# Patient Record
Sex: FEMALE | Race: WHITE | Employment: FULL TIME | URBAN - METROPOLITAN AREA
[De-identification: names, ages, dates, MRNs, and addresses within clinical notes are randomized per-mention and may not be internally consistent; named-entity substitution may affect disease eponyms.]

---

## 2019-06-03 ENCOUNTER — APPOINTMENT (OUTPATIENT)
Dept: GENERAL RADIOLOGY | Age: 44
End: 2019-06-03
Payer: COMMERCIAL

## 2019-06-03 ENCOUNTER — HOSPITAL ENCOUNTER (EMERGENCY)
Age: 44
Discharge: HOME | End: 2019-06-04
Payer: COMMERCIAL

## 2019-06-03 VITALS
WEIGHT: 283.95 LBS | TEMPERATURE: 98.4 F | SYSTOLIC BLOOD PRESSURE: 121 MMHG | HEART RATE: 81 BPM | RESPIRATION RATE: 14 BRPM | HEIGHT: 65 IN | OXYGEN SATURATION: 97 % | DIASTOLIC BLOOD PRESSURE: 70 MMHG | BODY MASS INDEX: 47.31 KG/M2

## 2019-06-03 DIAGNOSIS — M25.532 WRIST PAIN, ACUTE, LEFT: ICD-10-CM

## 2019-06-03 DIAGNOSIS — M25.522 ELBOW PAIN, LEFT: ICD-10-CM

## 2019-06-03 DIAGNOSIS — M79.645 THUMB PAIN, LEFT: ICD-10-CM
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
## 2019-06-03 PROCEDURE — 4500000024 HC ED LEVEL 4 PROCEDURE

## 2019-06-03 PROCEDURE — 6370000000 HC RX 637 (ALT 250 FOR IP): Performed by: NURSE PRACTITIONER

## 2019-06-03 PROCEDURE — 73110 X-RAY EXAM OF WRIST: CPT

## 2019-06-03 PROCEDURE — 99284 EMERGENCY DEPT VISIT MOD MDM: CPT

## 2019-06-03 PROCEDURE — 73080 X-RAY EXAM OF ELBOW: CPT

## 2019-06-03 RX ORDER — NAPROXEN 500 MG/1
500 TABLET ORAL 2 TIMES DAILY
Qty: 60 TABLET | Refills: 0 | Status: SHIPPED | OUTPATIENT
Start: 2019-06-03

## 2019-06-03 RX ORDER — HYDROCODONE BITARTRATE AND ACETAMINOPHEN 5; 325 MG/1; MG/1
1 TABLET ORAL ONCE
Status: COMPLETED | OUTPATIENT
Start: 2019-06-03 | End: 2019-06-03

## 2019-06-03 RX ADMIN — HYDROCODONE BITARTRATE AND ACETAMINOPHEN 1 TABLET: 5; 325 TABLET ORAL at 23:47

## 2019-06-03 ASSESSMENT — PAIN DESCRIPTION - PROGRESSION: CLINICAL_PROGRESSION: GRADUALLY WORSENING

## 2019-06-03 ASSESSMENT — PAIN DESCRIPTION - PAIN TYPE: TYPE: ACUTE PAIN

## 2019-06-03 ASSESSMENT — PAIN DESCRIPTION - ORIENTATION: ORIENTATION: LEFT

## 2019-06-03 ASSESSMENT — PAIN DESCRIPTION - DESCRIPTORS: DESCRIPTORS: THROBBING

## 2019-06-03 ASSESSMENT — PAIN DESCRIPTION - LOCATION: LOCATION: WRIST

## 2019-06-03 ASSESSMENT — PAIN SCALES - GENERAL
PAINLEVEL_OUTOF10: 6
PAINLEVEL_OUTOF10: 6

## 2019-06-03 ASSESSMENT — PAIN DESCRIPTION - ONSET: ONSET: SUDDEN

## 2019-06-03 ASSESSMENT — PAIN DESCRIPTION - FREQUENCY: FREQUENCY: CONTINUOUS

## 2019-06-04 ASSESSMENT — PAIN SCALES - GENERAL: PAINLEVEL_OUTOF10: 5

## 2019-06-04 NOTE — ED PROVIDER NOTES
[address]  [address] [facility] [address]  Dept: [phone]  Loc: [phone]    eMERGENCY dEPARTMENT eNCOUnter    Evaluated by the Advanced Practice Provider    CHIEF COMPLAINT    Chief Complaint   Patient presents with    Wrist Injury     left     Elbow Injury     left        HPI    Yefri Mcfadden is a 37 y.o. female who presents with left wrist, elbow and thumb pain. The onset was shortly PTA. The duration has been constant since the onset. The quality of the pain is sharp and the severity is 7/10. The patient has no other associated injury. The context is she was walking her dog when her dog's all a another dog and went to run after it, she pulled the leash and fell down extending her hands, fell on the left hand, wrist and elbow. Denies any numbness or tingling. No previous surgeries on this. Is right-handed dominant. No lacerations or bite sustained during the event. States that she came to the ED for further evaluation and treatment. REVIEW OF SYSTEMS    Skin: No lacerations or puncture wounds  Musculoskeletal: see HPI, no other joint or bony injury or pain  Neurologic: No loss of consciousness, no head injury, no paresthesias or focal distal extremity weakness  All other systems reviewed and are negative. PAST MEDICAL & SURGICAL HISTORY    No past medical history on file. No past surgical history on file.     CURRENT MEDICATIONS  (may include discharge medications prescribed in the ED)  Current Outpatient Rx   Medication Sig Dispense Refill    naproxen (NAPROSYN) 500 MG tablet Take 1 tablet by mouth 2 times daily 60 tablet 0       ALLERGIES    Allergies   Allergen Reactions    Amoxicillin Hives    Gabapentin Other (See Comments)     Felt foggy    Morphine And Related Hives and Itching    Penicillin G Hives    Levofloxacin Nausea And Vomiting       SOCIAL & FAMILY HISTORY    Social History     Socioeconomic History    Marital status:      Spouse name: Not on file    Number of children: Not on file    Years of education: Not on file    Highest education level: Not on file   Occupational History    Not on file   Social Needs    Financial resource strain: Not on file    Food insecurity:     Worry: Not on file     Inability: Not on file    Transportation needs:     Medical: Not on file     Non-medical: Not on file   Tobacco Use    Smoking status: Not on file   Substance and Sexual Activity    Alcohol use: Not on file    Drug use: Not on file    Sexual activity: Not on file   Lifestyle    Physical activity:     Days per week: Not on file     Minutes per session: Not on file    Stress: Not on file   Relationships    Social connections:     Talks on phone: Not on file     Gets together: Not on file     Attends Yazidi service: Not on file     Active member of club or organization: Not on file     Attends meetings of clubs or organizations: Not on file     Relationship status: Not on file    Intimate partner violence:     Fear of current or ex partner: Not on file     Emotionally abused: Not on file     Physically abused: Not on file     Forced sexual activity: Not on file   Other Topics Concern    Not on file   Social History Narrative    Not on file     No family history on file.       PHYSICAL EXAM    VITAL SIGNS: /70   Pulse 81   Temp 98.4 °F (36.9 °C) (Oral)   Resp 14   Ht 5' 5\" (1.651 m)   Wt 283 lb 15.2 oz (128.8 kg)   SpO2 97%   BMI 47.25 kg/m²   Constitutional:  Well nourished, no acute distress  HENT:  Atraumatic, moist mucous membranes  NECK: normal range of motion,  supple   Respiratory:  No respiratory distress  Cardiovascular:  No JVD  Vascular: left radial pulse 2+, capillary refill less than 2 seconds  Musculoskeletal:  + tenderness to palpation of the left wrist, some, positive snuffbox tenderness and mild tenderness of the olecranon process upon palpation, no edema, no deformity, she does have bruising on the anterior aspect of the left thumb  Integument:  Well hydrated, no skin lacerations   Neurologic:  Awake alert, no slurred speech, sensory and motor intact in the affected limb    RADIOLOGY   XR WRIST LEFT (MIN 3 VIEWS)   Final Result   No acute bony or joint abnormalities seen in the left elbow or left wrist         XR ELBOW LEFT (MIN 3 VIEWS)   Final Result   No acute bony or joint abnormalities seen in the left elbow or left wrist             PROCEDURES  SPLINT PLACEMENT  A left thumb spica OCL splint was applied to the affected limb by the emergency department technician. I evaluated the splint after it was applied. The splint was in good position. The patient's extremity was neurovascularly intact after the splint placement. ED COURSE & MEDICAL DECISION MAKING   See chart for medications given during emergency department course. Differential diagnosis: includes but not limited to Arterial Injury/Ischemia, Fracture, Dislocation, Infection, Compartment Syndrome, Neurologic Deficit/Injury. No evidence of neurovascular injury on exam.  Plain films as above. The patient was instructed to follow up as an outpatient in 2 days with ortho. The patient was instructed to return to the ED immediately for any new or worsening symptoms. The patient verbalized understanding. I have evaluated this patient. My supervising physician was available for consultation. FINAL IMPRESSION    1. Fall, initial encounter    2. Wrist pain, acute, left    3.  Thumb pain, left    4. Elbow pain, left        PLAN  Discharge with outpatient follow-up and discharge instructions (see EMR)    (Please note that this note was completed with a voice recognition program.  Every attempt was made to edit the dictations, but inevitably there remain words that are mis-transcribed.)       FELICITAS Stack - CNP  06/03/19 [id number]

## 2019-06-06 ENCOUNTER — OFFICE VISIT (OUTPATIENT)
Dept: ORTHOPEDIC SURGERY | Age: 44
End: 2019-06-06
Payer: COMMERCIAL

## 2019-06-06 VITALS
WEIGHT: 280 LBS | RESPIRATION RATE: 16 BRPM | BODY MASS INDEX: 46.65 KG/M2 | HEIGHT: 65 IN | DIASTOLIC BLOOD PRESSURE: 66 MMHG | SYSTOLIC BLOOD PRESSURE: 100 MMHG

## 2019-06-06 DIAGNOSIS — S60.212A CONTUSION OF LEFT WRIST, INITIAL ENCOUNTER: Primary | ICD-10-CM

## 2019-06-06 PROCEDURE — 99203 OFFICE O/P NEW LOW 30 MIN: CPT | Performed by: ORTHOPAEDIC SURGERY

## 2019-06-06 RX ORDER — ATORVASTATIN CALCIUM 40 MG/1
40 TABLET, FILM COATED ORAL
COMMUNITY
Start: 2018-11-03

## 2019-06-06 RX ORDER — SITAGLIPTIN 100 MG/1
TABLET, FILM COATED ORAL
COMMUNITY
Start: 2019-03-17

## 2019-06-06 RX ORDER — LISINOPRIL 10 MG/1
10 TABLET ORAL
COMMUNITY
Start: 2015-04-09

## 2019-06-06 RX ORDER — OMEPRAZOLE 40 MG/1
40 CAPSULE, DELAYED RELEASE ORAL
COMMUNITY
Start: 2018-07-05

## 2019-06-06 NOTE — PROGRESS NOTES
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 .  She noticed pain. She was evaluated at the [facility] were obtained, all read as negative and the patient was splinted and referred for hand/upper extremity evaluation and treatment. There is no history of additional significant injury. Symptoms have improved since the date of injury. The pain assessment has been reviewed and is correct. The patient's social history, past medical history, family history, medications, allergies and review of systems, entered 6/6/19,  have been reviewed, and dated and are recorded in the chart. On physical examination the patient is Height: 5' 5\" (165.1 cm) tall and weighs Weight: 280 lb (127 kg). Respirations are 18 per minute. The patient is well nourished, is oriented to time and place, demonstrates appropriate mood and affect as well as normal gait and station. There is no soft tissue swelling present about the left wrist, thumb, elbow. There is no discoloration. There is no deformity. Tenderness is not present on palpation in the area of the left wrist, hand, elbow  Range of motion of the hand, wrist and elbow is normal bilaterally and is accompanied by mild pain on the left. Skin is intact, as is distal circulation and sensation. Gross muscle strength is limited only on the left   Hand and wrist joints are stable. There are no subcutaneous nodules or enlarged epitrochlear lymph nodes. Xrays: Review of outside Xrays of the left hand, wrist and elbow demonstrate no significant abnormality. The radiologist's report concurs. .  Impression: Contusion left hand, wrist and elbow. The nature of this medical problem is fully discussed with the patient, including all treatment options.  All questions are answered. No treatment is required at this time. The usual course of events in the resolution of the symptoms associated with this condition is fully discussed with the patient. As long as they progress as expected, they do not need to return for further follow up. They are, however, urged to call or return if they have questions or concerns.